# Patient Record
Sex: FEMALE | Race: WHITE | NOT HISPANIC OR LATINO | URBAN - METROPOLITAN AREA
[De-identification: names, ages, dates, MRNs, and addresses within clinical notes are randomized per-mention and may not be internally consistent; named-entity substitution may affect disease eponyms.]

---
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
## 2025-06-23 NOTE — ED PROVIDER NOTE - OBJECTIVE STATEMENT
ICC VISIT NOTE       Chief Complaint   Patient presents with   • Allergic Reaction     Pt states that 3 days ago she suddenly developed hives on her abdomen and back. Pt given benadryl and hives resolved. Yesterday, pt developed swollen eyes especially the left eye. Pt states this morning she woke up suddenly out of her sleep stating she had difficulty breathing. Currently pt denies SOB or difficulty breathing. Pt given benadryl this morning.      Patient seen at 8:27 AM  Mom present  History Of Present Illness  Destiney is a 12 year old female presenting with history of hives on Friday.  Resolved after Benadryl.  Now with left > right puffy eyes.  No rash, difficulty swallowing or breathing.  No discharge from eyes.  Mom and patient deny any new soaps, foods, meds.       Past Medical History  Past Medical History:   Diagnosis Date   • No known problems         Surgical History  Past Surgical History:   Procedure Laterality Date   • No past surgeries          Social History  Social History     Tobacco Use   • Smoking status: Never     Passive exposure: Never   • Smokeless tobacco: Never   Substance Use Topics   • Drug use: Never       Family History    Family History   Problem Relation Age of Onset   • Patient is unaware of any medical problems Mother    • Asthma Father         FH reviewed and negative for any heart or lung disease, bleeding disorders, or issues related to this complaint.     Allergies  ALLERGIES:  Penicillins    Medications  No current outpatient medications on file.     No current facility-administered medications for this visit.        Review of Systems  Constitutional: Negative for fever and chills.  Skin: Negative for rash.  HEENT: Negative for eye drainage, ear pain, rhinorrhea, sinus congestion, sore throat.  Respiratory: Negative wheezing, shortness of breath, cough.  Cardiovascular: Negative for chest pain, chest pressure, palpitations or diaphoresis.  Gastrointestinal: Negative for nausea,  Patient is an 16 yo F with no PMH presenting w/ a 5 day hx of abdominal pain. The pain is located in the b/l upper quadrants of her abdomen. It is sharp and cramping in nature as well as a 8/10 in severity. It is associated with frequent belching and bloating. No radiation, fever, recent illness, hx of gall stones, diet changes, frequent spicy or acidic foods. She believes that dairy and carbs might be triggers. She has tried omeprazole and Gas-X with no relief. Recent celiac work-up was negative. LMP was 2 days ago. She occasionally smokes marijuana and drinks alcohol, but has not done so for the past few weeks. vomiting, diarrhea, abdominal pain.  Genitourinary: Negative for dysuria, urgency, frequency, hematuria or flank pain.  Extremities:  Negative for joint swelling, joint pain.  Neurologic:  Negative for change in sensory or motor function.  Negative for headache.  Endocrine: Negative for heat or cold intolerance, weight loss or gain.  Hematological: Negative for bleeding, bruising or adenopathy.  Psychiatric: Negative for change in affect, change in mentation or sleep disturbance.  All other systems reviewed and otherwise negative unless noted.       Last Recorded Vitals  Vitals:    06/23/25 0821   BP: 111/75   BP Location: LUE - Left upper extremity   Patient Position: Sitting   Cuff Size: Regular   Pulse: 72   Resp: 18   Temp: 98.3 °F (36.8 °C)   TempSrc: Oral   SpO2: 98%   Weight: 69.2 kg (152 lb 8.9 oz)   PainSc:  0   LMP: 06/02/2025        Physical Exam  Vitals and nursing note reviewed.   Constitutional:       General: She is not in acute distress.     Appearance: Normal appearance. She is not ill-appearing.   HENT:      Head: Normocephalic and atraumatic.      Right Ear: Hearing, tympanic membrane, ear canal and external ear normal.      Left Ear: Hearing, tympanic membrane, ear canal and external ear normal.      Nose: Nose normal.      Mouth/Throat:      Mouth: Mucous membranes are moist.      Pharynx: No oropharyngeal exudate or posterior oropharyngeal erythema.   Eyes:      General: Allergic shiner present.         Right eye: No foreign body, discharge or hordeolum.         Left eye: No foreign body, discharge or hordeolum.      Conjunctiva/sclera: Conjunctivae normal.      Right eye: Right conjunctiva is not injected. No chemosis or exudate.     Left eye: Left conjunctiva is not injected. No chemosis or exudate.     Pupils: Pupils are equal, round, and reactive to light.   Cardiovascular:      Rate and Rhythm: Normal rate and regular rhythm.      Heart sounds: Normal heart sounds.   Pulmonary:       Effort: Pulmonary effort is normal. No respiratory distress.      Breath sounds: Normal breath sounds. No wheezing.   Musculoskeletal:         General: Normal range of motion.      Cervical back: Normal range of motion and neck supple.   Lymphadenopathy:      Cervical: No cervical adenopathy.   Skin:     General: Skin is warm and dry.      Findings: No ecchymosis, lesion or rash.   Neurological:      Mental Status: She is alert.   Psychiatric:         Mood and Affect: Mood and affect normal.         Judgment: Judgment normal.             Differential Diagnosis/MDM:  Diagnoses that have been ruled out:   anaphylaxis   Diagnoses that are still under consideration:   Patient presents to [facility] for evaluation of puffy eyes. Suspect that patient has recent urticaria hx as etiology for their symptoms.   They were instructed to follow up with PCP. All questions were answered and criteria necessitating return visit to ICC/ED was discussed     Ddx: urticaria, seasonal allergy  2.  Symptomatic care discussed.  See patient AVS instructions for any further details.  3.  Instructed patient on appropriate medical follow-up.  See below  4.  Further evaluation: PCP PRN  5.  Patient will be contacted with any positive or discordant results via phone.      The plan was discussed verbally and key components were summarized in the discharge instructions. All questions were answered. In discussing management and follow-up, they are advised that the plan is based only on information that was available at the time of the [facility] evaluation. Additional testing and treatment may be necessary, and outpatient evaluation is frequently required to ensure complete care. At the same time, there is always potential for symptoms to recur or worsen, or for additional signs or symptoms to develop that could substantially affect the treatment plan. If any new or uncontrolled  symptoms occur, or if there are any other concerns, they are advised to seek  medical evaluation immediately.     Condition on Discharge: Good   Final diagnoses:   1. Allergic urticaria            Diagnosis:   (L50.0) Allergic urticaria  (primary encounter diagnosis)      New Prescriptions    No medications on file       Follow-up Information     Follow up With Specialties Details Why Contact Info    Immanuel Zepeda MD Pediatrics Follow up in 3 day(s) For recheck, If needed [address]  [address]  [place] [address]  [phone]              Patient Instructions   Continue Benadryl.  Consider Claritin or Zyrtec for less drowsiness  Pataday eye drops if eyes become more itchy.  Follow up with PCP if needed.     Thank you for choosing [facility] for your healthcare needs.    We strive to provide you with excellent service and hope that we have exceeded your expectations.     If you receive a survey in the mail, we hope that you will complete it and agree that we have provided \"Very Good\" care today.  If you would like to speak to us about your visit, please contact our center at:    [facility]  [phone]    [facility]  [phone]    [facility]  [phone]      Primary Care Physician  Immanuel Zepeda MD David L Thomas, MD    The 21st Century Cures Act makes medical notes like these available to patients in the interest of transparency. However, be advised this is a medical document. It is intended as peer to peer communication. It is written in medical language and may contain abbreviations, jargon, and other verbiage that could be misleading or confusing to lay persons. It may appear blunt or direct. Medical documents are intended to carry relevant information, facts as evident, and the clinical opinion of the medical provider.   Patient is an 16 yo F with no PMH presenting w/ a 5 day hx of abdominal pain. The pain is located in the b/l upper quadrants of her abdomen. It is sharp and cramping in nature as well as a 8/10 in severity. It is associated with frequent belching and bloating. No radiation, fever, recent illness, hx of gall stones, diet changes, jaundice, frequent spicy or acidic foods. She believes that dairy and carbs might be triggers. She has tried omeprazole and Gas-X with no relief. Recent celiac work-up was negative. LMP was 2 days ago. She occasionally smokes marijuana and drinks alcohol, but has not done so for the past few weeks.    PMH None  BHx FT,  for macrosomia. No NICU  Meds None  Allergies  Vaccines UTD  PMD Gayla